# Patient Record
Sex: FEMALE | Race: WHITE | NOT HISPANIC OR LATINO | Employment: UNEMPLOYED | URBAN - METROPOLITAN AREA
[De-identification: names, ages, dates, MRNs, and addresses within clinical notes are randomized per-mention and may not be internally consistent; named-entity substitution may affect disease eponyms.]

---

## 2019-11-26 PROBLEM — O36.5930 POOR FETAL GROWTH AFFECTING MANAGEMENT OF MOTHER IN THIRD TRIMESTER: Status: ACTIVE | Noted: 2019-11-26

## 2019-11-26 PROBLEM — B18.9 CHRONIC VIRAL HEPATITIS COMPLICATING PREGNANCY: Status: ACTIVE | Noted: 2019-11-26

## 2019-11-26 PROBLEM — O98.419 CHRONIC VIRAL HEPATITIS COMPLICATING PREGNANCY: Status: ACTIVE | Noted: 2019-11-26

## 2020-03-02 PROBLEM — O36.5930 POOR FETAL GROWTH AFFECTING MANAGEMENT OF MOTHER IN THIRD TRIMESTER: Status: RESOLVED | Noted: 2019-11-26 | Resolved: 2020-03-02

## 2023-06-27 ENCOUNTER — HOSPITAL ENCOUNTER (EMERGENCY)
Facility: HOSPITAL | Age: 38
Discharge: HOME | End: 2023-06-27
Attending: EMERGENCY MEDICINE
Payer: MEDICAID

## 2023-06-27 VITALS
DIASTOLIC BLOOD PRESSURE: 84 MMHG | BODY MASS INDEX: 19.7 KG/M2 | OXYGEN SATURATION: 100 % | SYSTOLIC BLOOD PRESSURE: 118 MMHG | HEART RATE: 99 BPM | TEMPERATURE: 99 F | RESPIRATION RATE: 20 BRPM | HEIGHT: 65 IN | WEIGHT: 118.25 LBS

## 2023-06-27 DIAGNOSIS — L03.116 CELLULITIS OF LEFT FOOT: Primary | ICD-10-CM

## 2023-06-27 LAB
ALBUMIN SERPL BCP-MCNC: 3.6 G/DL (ref 3.5–5.2)
ALP SERPL-CCNC: 27 U/L (ref 55–135)
ALT SERPL W/O P-5'-P-CCNC: 75 U/L (ref 10–44)
ANION GAP SERPL CALC-SCNC: 11 MMOL/L (ref 8–16)
AST SERPL-CCNC: 27 U/L (ref 10–40)
BASOPHILS # BLD AUTO: 0.04 K/UL (ref 0–0.2)
BASOPHILS NFR BLD: 0.3 % (ref 0–1.9)
BILIRUB SERPL-MCNC: 0.2 MG/DL (ref 0.1–1)
BUN SERPL-MCNC: 9 MG/DL (ref 6–20)
CALCIUM SERPL-MCNC: 8.9 MG/DL (ref 8.7–10.5)
CHLORIDE SERPL-SCNC: 106 MMOL/L (ref 95–110)
CO2 SERPL-SCNC: 22 MMOL/L (ref 23–29)
CREAT SERPL-MCNC: 0.8 MG/DL (ref 0.5–1.4)
CRP SERPL-MCNC: 39.8 MG/L (ref 0–8.2)
DIFFERENTIAL METHOD: ABNORMAL
EOSINOPHIL # BLD AUTO: 0.5 K/UL (ref 0–0.5)
EOSINOPHIL NFR BLD: 3.5 % (ref 0–8)
ERYTHROCYTE [DISTWIDTH] IN BLOOD BY AUTOMATED COUNT: 11.9 % (ref 11.5–14.5)
EST. GFR  (NO RACE VARIABLE): >60 ML/MIN/1.73 M^2
GLUCOSE SERPL-MCNC: 78 MG/DL (ref 70–110)
HCT VFR BLD AUTO: 37.2 % (ref 37–48.5)
HGB BLD-MCNC: 12.7 G/DL (ref 12–16)
HIV 1+2 AB+HIV1 P24 AG SERPL QL IA: NEGATIVE
IMM GRANULOCYTES # BLD AUTO: 0.06 K/UL (ref 0–0.04)
IMM GRANULOCYTES NFR BLD AUTO: 0.4 % (ref 0–0.5)
LYMPHOCYTES # BLD AUTO: 2.6 K/UL (ref 1–4.8)
LYMPHOCYTES NFR BLD: 18.5 % (ref 18–48)
MCH RBC QN AUTO: 30.1 PG (ref 27–31)
MCHC RBC AUTO-ENTMCNC: 34.1 G/DL (ref 32–36)
MCV RBC AUTO: 88 FL (ref 82–98)
MONOCYTES # BLD AUTO: 1.3 K/UL (ref 0.3–1)
MONOCYTES NFR BLD: 9.4 % (ref 4–15)
NEUTROPHILS # BLD AUTO: 9.5 K/UL (ref 1.8–7.7)
NEUTROPHILS NFR BLD: 67.9 % (ref 38–73)
NRBC BLD-RTO: 0 /100 WBC
PLATELET # BLD AUTO: 311 K/UL (ref 150–450)
PMV BLD AUTO: 8.8 FL (ref 9.2–12.9)
POTASSIUM SERPL-SCNC: 3.9 MMOL/L (ref 3.5–5.1)
PROT SERPL-MCNC: 6.8 G/DL (ref 6–8.4)
RBC # BLD AUTO: 4.22 M/UL (ref 4–5.4)
SODIUM SERPL-SCNC: 139 MMOL/L (ref 136–145)
WBC # BLD AUTO: 14 K/UL (ref 3.9–12.7)

## 2023-06-27 PROCEDURE — 85025 COMPLETE CBC W/AUTO DIFF WBC: CPT | Performed by: REGISTERED NURSE

## 2023-06-27 PROCEDURE — 80053 COMPREHEN METABOLIC PANEL: CPT | Performed by: REGISTERED NURSE

## 2023-06-27 PROCEDURE — 87389 HIV-1 AG W/HIV-1&-2 AB AG IA: CPT | Performed by: EMERGENCY MEDICINE

## 2023-06-27 PROCEDURE — 99284 EMERGENCY DEPT VISIT MOD MDM: CPT

## 2023-06-27 PROCEDURE — 86140 C-REACTIVE PROTEIN: CPT | Performed by: REGISTERED NURSE

## 2023-06-27 PROCEDURE — 25000003 PHARM REV CODE 250: Performed by: REGISTERED NURSE

## 2023-06-27 RX ORDER — KETOROLAC TROMETHAMINE 10 MG/1
10 TABLET, FILM COATED ORAL
Status: COMPLETED | OUTPATIENT
Start: 2023-06-27 | End: 2023-06-27

## 2023-06-27 RX ORDER — KETOROLAC TROMETHAMINE 10 MG/1
10 TABLET, FILM COATED ORAL EVERY 6 HOURS
Qty: 20 TABLET | Refills: 0 | Status: SHIPPED | OUTPATIENT
Start: 2023-06-27 | End: 2023-07-02

## 2023-06-27 RX ORDER — IBUPROFEN 800 MG/1
800 TABLET ORAL
Status: DISCONTINUED | OUTPATIENT
Start: 2023-06-27 | End: 2023-06-27

## 2023-06-27 RX ORDER — CLINDAMYCIN HYDROCHLORIDE 150 MG/1
300 CAPSULE ORAL
Status: COMPLETED | OUTPATIENT
Start: 2023-06-27 | End: 2023-06-27

## 2023-06-27 RX ORDER — CLINDAMYCIN HYDROCHLORIDE 300 MG/1
300 CAPSULE ORAL EVERY 6 HOURS
Qty: 28 CAPSULE | Refills: 0 | Status: SHIPPED | OUTPATIENT
Start: 2023-06-27 | End: 2023-07-04

## 2023-06-27 RX ADMIN — CLINDAMYCIN HYDROCHLORIDE 300 MG: 150 CAPSULE ORAL at 01:06

## 2023-06-27 RX ADMIN — KETOROLAC TROMETHAMINE 10 MG: 10 TABLET, FILM COATED ORAL at 01:06

## 2023-06-27 NOTE — ED PROVIDER NOTES
Encounter Date: 6/27/2023       History     Chief Complaint   Patient presents with    Foot Pain     Left foot pain, redness, and swelling first noticed about 23:00 last night. States coming from [place]. Blister noted to left posterior heel in triage. Surrounding redness and induration.      37-year-old female presents emergency department with complaints of left foot pain and swelling that began last night.  Patient also has a blister noted to the back of the heel.  Patient currently in detox for Xanax abuse.  Patient denies any IV drug use.  Patient denies any chest pain, shortness of breath, nausea/vomiting, fever, chills or any other symptoms at this time.    The history is provided by the patient.   Review of patient's allergies indicates:   Allergen Reactions    Pcn [penicillins]     Sulfa (sulfonamide antibiotics)      Past Medical History:   Diagnosis Date    Drug abuse     Hepatitis C     Hepatitis C     History of substance abuse      Past Surgical History:   Procedure Laterality Date    ADENOIDECTOMY      TONSILLECTOMY       Family History   Problem Relation Age of Onset    Cirrhosis Father     Cancer Mother      Social History     Tobacco Use    Smoking status: Every Day     Packs/day: 0.25     Types: Cigarettes    Smokeless tobacco: Never   Substance Use Topics    Alcohol use: Not Currently    Drug use: Not Currently     Review of Systems   Constitutional:  Negative for fever.   HENT:  Negative for sore throat.    Respiratory:  Negative for shortness of breath.    Cardiovascular:  Negative for chest pain.   Gastrointestinal:  Negative for nausea.   Genitourinary:  Negative for dysuria.   Musculoskeletal:  Negative for back pain.        +left foot pain and swelling   Skin:  Negative for rash.   Neurological:  Negative for weakness.   Hematological:  Does not bruise/bleed easily.   All other systems reviewed and are negative.    Physical Exam     Initial Vitals [06/27/23 1153]   BP Pulse Resp Temp SpO2    118/84 99 20 98.7 °F (37.1 °C) 100 %      MAP       --         Physical Exam    Constitutional: She appears well-developed and well-nourished. She is not diaphoretic. No distress.   HENT:   Head: Normocephalic and atraumatic.   Eyes: Conjunctivae and EOM are normal. Pupils are equal, round, and reactive to light.   Neck: Neck supple.   Normal range of motion.  Cardiovascular:  Normal rate, regular rhythm and normal heart sounds.           No murmur heard.  Pulmonary/Chest: Breath sounds normal. No respiratory distress. She has no wheezes. She has no rales.   Abdominal: Abdomen is soft. Bowel sounds are normal. There is no abdominal tenderness. There is no rebound and no guarding.   Musculoskeletal:         General: No edema. Normal range of motion.      Cervical back: Normal range of motion and neck supple.      Left foot: Swelling and tenderness present.      Comments: Erythema noted to the dorsum and lateral aspect of left foot.  Blister noted to the heel.  Mild tenderness, no drainage     Neurological: She is alert and oriented to person, place, and time. No cranial nerve deficit. GCS score is 15. GCS eye subscore is 4. GCS verbal subscore is 5. GCS motor subscore is 6.   Skin: Skin is warm and dry. Capillary refill takes less than 2 seconds.   Psychiatric: She has a normal mood and affect. Thought content normal.       ED Course   Procedures  Labs Reviewed   CBC W/ AUTO DIFFERENTIAL - Abnormal; Notable for the following components:       Result Value    WBC 14.00 (*)     MPV 8.8 (*)     Gran # (ANC) 9.5 (*)     Immature Grans (Abs) 0.06 (*)     Mono # 1.3 (*)     All other components within normal limits    Narrative:     Release to patient->Immediate   COMPREHENSIVE METABOLIC PANEL - Abnormal; Notable for the following components:    CO2 22 (*)     Alkaline Phosphatase 27 (*)     ALT 75 (*)     All other components within normal limits    Narrative:     Release to patient->Immediate   C-REACTIVE PROTEIN -  Abnormal; Notable for the following components:    CRP 39.8 (*)     All other components within normal limits    Narrative:     Release to patient->Immediate   HIV 1 / 2 ANTIBODY          Imaging Results    None          Medications   clindamycin capsule 300 mg (300 mg Oral Given 6/27/23 1310)   ketorolac tablet 10 mg (10 mg Oral Given 6/27/23 1310)     Medical Decision Making:   Clinical Tests:   Lab Tests: Ordered and Reviewed  The following lab test(s) were unremarkable: CBC and CMP       <> Summary of Lab: Elevated white blood cell count and CRP  ED Management:  Patient given antibiotics in the emergency department.  Prescription written for 7 day course.  Follow up with primary care or return the emergency department for worsening symptoms.                        Clinical Impression:   Final diagnoses:  [L03.116] Cellulitis of left foot (Primary)        ED Disposition Condition    Discharge Stable          ED Prescriptions       Medication Sig Dispense Start Date End Date Auth. Provider    clindamycin (CLEOCIN) 300 MG capsule Take 1 capsule (300 mg total) by mouth every 6 (six) hours. for 7 days 28 capsule 6/27/2023 7/4/2023 JOANIE Jimenez Jr.    ketorolac (TORADOL) 10 mg tablet Take 1 tablet (10 mg total) by mouth every 6 (six) hours. for 5 days 20 tablet 6/27/2023 7/2/2023 JOANIE Jimenez Jr.          Follow-up Information       Follow up With Specialties Details Why Contact Info    [facility] - Emergency Dept. Emergency Medicine  If symptoms worsen [id number] [facility] [id number] [phone]             JOANIE Jimenez Jr.  06/27/23 [id number]